# Patient Record
Sex: MALE | Race: WHITE | NOT HISPANIC OR LATINO | Employment: FULL TIME | URBAN - METROPOLITAN AREA
[De-identification: names, ages, dates, MRNs, and addresses within clinical notes are randomized per-mention and may not be internally consistent; named-entity substitution may affect disease eponyms.]

---

## 2024-09-16 ENCOUNTER — ANCILLARY PROCEDURE (OUTPATIENT)
Dept: GENERAL RADIOLOGY | Facility: CLINIC | Age: 39
End: 2024-09-16
Attending: PHYSICIAN ASSISTANT
Payer: COMMERCIAL

## 2024-09-16 ENCOUNTER — OFFICE VISIT (OUTPATIENT)
Dept: URGENT CARE | Facility: URGENT CARE | Age: 39
End: 2024-09-16
Payer: COMMERCIAL

## 2024-09-16 VITALS
DIASTOLIC BLOOD PRESSURE: 74 MMHG | HEIGHT: 71 IN | RESPIRATION RATE: 16 BRPM | HEART RATE: 62 BPM | BODY MASS INDEX: 23.8 KG/M2 | TEMPERATURE: 98.4 F | WEIGHT: 170 LBS | SYSTOLIC BLOOD PRESSURE: 113 MMHG | OXYGEN SATURATION: 98 %
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
## 2024-09-16 DIAGNOSIS — S92.422A CLOSED DISPLACED FRACTURE OF DISTAL PHALANX OF LEFT GREAT TOE, INITIAL ENCOUNTER: ICD-10-CM

## 2024-09-16 PROCEDURE — 73630 X-RAY EXAM OF FOOT: CPT | Mod: TC | Performed by: INTERNAL MEDICINE

## 2024-09-16 PROCEDURE — 99204 OFFICE O/P NEW MOD 45 MIN: CPT | Performed by: PHYSICIAN ASSISTANT

## 2024-09-16 RX ORDER — ROSUVASTATIN CALCIUM 10 MG/1
10 TABLET, COATED ORAL DAILY
COMMUNITY
Start: 2024-07-22 | End: 2025-07-22

## 2024-09-16 RX ORDER — IBUPROFEN 800 MG/1
800 TABLET, FILM COATED ORAL EVERY 8 HOURS PRN
Qty: 30 TABLET | Refills: 0 | Status: SHIPPED | OUTPATIENT
Start: 2024-09-16

## 2024-09-16 ASSESSMENT — ENCOUNTER SYMPTOMS
ARTHRALGIAS: 1
COLOR CHANGE: 1
JOINT SWELLING: 1

## 2024-09-16 NOTE — PROGRESS NOTES
"SUBJECTIVE:   Jaret Riddle is a 39 year old male presenting with a chief complaint of   Chief Complaint   Patient presents with    Musculoskeletal Problem     Injured left foot x1 day ago, painful to put pressure on, swelling     Urgent Care       He is a new patient of [facility].  Patient presents with left foot pain that occurred while running and a ball caught between legs.  Patient wearing gym shoes at the time.  States initially ok, but unable to walk normally.      Treatment:  ice    Review of Systems   Musculoskeletal:  Positive for arthralgias and joint swelling.   Skin:  Positive for color change.   All other systems reviewed and are negative.      Past Medical History:   Diagnosis Date    NO ACTIVE PROBLEMS      Family History   Problem Relation Age of Onset    Hypertension Mother     Lipids Mother     Cancer - colorectal Paternal Grandmother         diagnosed age 70    Unknown/Adopted Father     C.A.D. Maternal Grandfather         heart attacks     Current Outpatient Medications   Medication Sig Dispense Refill    ibuprofen (ADVIL/MOTRIN) 800 MG tablet Take 1 tablet (800 mg) by mouth every 8 hours as needed for moderate pain. 30 tablet 0    rosuvastatin (CRESTOR) 10 MG tablet Take 10 mg by mouth daily.      NO ACTIVE MEDICATIONS .       Social History     Tobacco Use    Smoking status: Never    Smokeless tobacco: Never   Substance Use Topics    Alcohol use: No       OBJECTIVE  /74   Pulse 62   Temp 98.4  F (36.9  C) (Temporal)   Resp 16   Ht 1.803 m (5' 11\")   Wt 77.1 kg (170 lb)   SpO2 98%   BMI 23.71 kg/m      Physical Exam  Vitals and nursing note reviewed.   Constitutional:       Appearance: Normal appearance. He is normal weight.   Cardiovascular:      Rate and Rhythm: Normal rate.   Musculoskeletal:      Comments: Left foot:  great toe with ecchymosis, erythema and edema.  DP palpated.  Painful ROM.     Neurological:      General: No focal deficit present.      Mental Status: He is " alert.         Labs:  Results for orders placed or performed in visit on 09/16/24 (from the past 24 hour(s))   XR Foot Left G/E 3 Views    Narrative    FOOT LEFT THREE OR MORE VIEWS September 16, 2024 11:07 AM     HISTORY: Fall, initial encounter.    COMPARISON: Radiograph 5/28/2010.      Impression    IMPRESSION:   1. Mildly displaced intra-articular left first proximal phalanx  fracture along the tibial aspect of the phalangeal base.  2. Joint spaces are preserved.       X-Ray was done, my findings are: Xrays reviewed by myself and independently interpreted.  Any significant discrepancies with official radiologic read, patient will be notified.       1st phalanx fracture       ASSESSMENT:      ICD-10-CM    1. Fall, initial encounter  W19.XXXA XR Foot Left G/E 3 Views     ibuprofen (ADVIL/MOTRIN) 800 MG tablet      2. Closed displaced fracture of distal phalanx of left great toe, initial encounter  S92.422A            Medical Decision Making:    Differential Diagnosis:  MS Injury Pain: fracture and contusion    Serious Comorbid Conditions:  Adult:   reviewed    PLAN:  Patient placed in a walking boot.  Referral to podiatry.  Rx for ibuprofen with food.  Discussed reasons to seek immediate medical attention.  Additionally if no improvement or worsening in one week, may follow up with PCP and/or UC.        Followup:    If not improving or if condition worsens, follow up with your Primary Care Provider, In 10  day(s) follow up with  Podiatry    There are no Patient Instructions on file for this visit.

## 2024-10-03 ENCOUNTER — TELEPHONE (OUTPATIENT)
Dept: PODIATRY | Facility: CLINIC | Age: 39
End: 2024-10-03

## 2024-10-03 ENCOUNTER — OFFICE VISIT (OUTPATIENT)
Dept: PODIATRY | Facility: CLINIC | Age: 39
End: 2024-10-03
Attending: PHYSICIAN ASSISTANT
Payer: COMMERCIAL

## 2024-10-03 VITALS
BODY MASS INDEX: 23.8 KG/M2 | HEART RATE: 70 BPM | SYSTOLIC BLOOD PRESSURE: 111 MMHG | WEIGHT: 170 LBS | HEIGHT: 71 IN | DIASTOLIC BLOOD PRESSURE: 73 MMHG

## 2024-10-03 DIAGNOSIS — S92.412A CLOSED DISPLACED FRACTURE OF PROXIMAL PHALANX OF LEFT GREAT TOE, INITIAL ENCOUNTER: Primary | ICD-10-CM

## 2024-10-03 DIAGNOSIS — M25.80: ICD-10-CM

## 2024-10-03 PROCEDURE — 99203 OFFICE O/P NEW LOW 30 MIN: CPT | Performed by: PODIATRIST

## 2024-10-03 ASSESSMENT — PAIN SCALES - GENERAL: PAINLEVEL: MODERATE PAIN (4)

## 2024-10-03 NOTE — TELEPHONE ENCOUNTER
Ottoniel Garcia,    Can we try to schedule this patient for surgery for October 14.  He was seen by Dr. Luis Antonio Castaneda up [place] but lives closer to here and wanting surgery at either [facility] or [facility] so been contacting me to see about surgery.  I told him I could.    Thanks Marylou Garcia, OMKARM

## 2024-10-03 NOTE — PROGRESS NOTES
PATIENT HISTORY:  Jaret Riddle is a 39 year old male who presents to clinic in consultation at the request of  Trudi GRANGER with a chief complaint of great left toe fracture.  The patient is seen by themselves.  The patient relates the pain is primarily located around the great toe.  Patient describes injury as playing a game and kicked a ball with that toe injuring it.  The patient relates that the symptoms have been going on for several week(s).  The patient has previously tried different shoes, pain medication, boot, and rest with little relief.  The patient is currently employed as recreation programming .  Any previous notes and studies that pertain to the patient's condition were reviewed.    Pertinent medical, surgical and family history was reviewed in the Epic chart.    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 (Patient not taking: Reported on 10/24/2024), Disp: 30 tablet, Rfl: 0    rosuvastatin (CRESTOR) 10 MG tablet, Take 10 mg by mouth daily., Disp: , Rfl:     acetaminophen (TYLENOL) 500 MG tablet, Take 500-1,000 mg by mouth every 6 hours as needed for mild pain. (Patient not taking: Reported on 10/24/2024), Disp: , Rfl:     HYDROcodone-acetaminophen (NORCO) 5-325 MG tablet, Take 1-2 tablets by mouth every 4 hours as needed for moderate to severe pain. (Patient not taking: Reported on 10/24/2024), Disp: 20 tablet, Rfl: 0    ondansetron (ZOFRAN ODT) 4 MG ODT tab, Take 1 tablet (4 mg) by mouth every 8 hours as needed for nausea. (Patient not taking: Reported on 10/24/2024), Disp: 4 tablet, Rfl: 0    senna-docusate (SENOKOT-S/PERICOLACE) 8.6-50 MG tablet, Take  "1-2 tablets by mouth 2 times daily. (Patient not taking: Reported on 10/24/2024), Disp: 30 tablet, Rfl: 0     Allergies:  No Known Allergies    Vitals: /73   Pulse 70   Ht 1.803 m (5' 11\")   Wt 77.1 kg (170 lb)   BMI 23.71 kg/m    BMI= Body mass index is 23.71 kg/m .    LOWER EXTREMITY PHYSICAL EXAM    Dermatologic: Skin is intact to left lower extremity without significant lesions, rash or abrasion.        Vascular: DP & PT pulses are intact & regular on the left.   CFT and skin temperature is normal to the left lower extremity.     Neurologic: Lower extremity sensation is intact to light touch.  No evidence of weakness in the left lower extremity.        Musculoskeletal: Patient is ambulatory without assistive device or brace.  No gross ankle deformity noted.  No foot or ankle joint effusion is noted.  Noted pain on palpation over the base of the left great toe.  Limited range of motion of the first metatarsophalangeal on the left.    Diagnostics:  Radiographs included three views of the left foot demonstrating a small avulsion fracture off the base of the proximal phalanx of the left great toe.  No cortical erosions or periosteal elevation.  All joint margins appear stable.  There is no apparent fracture or tumor formation noted.  There is no evidence of foreign body.  The images were independently reviewed by myself along with the patient explaining the findings.      ASSESSMENT / PLAN:     ICD-10-CM    1. Closed displaced fracture of proximal phalanx of left great toe, initial encounter  S92.412A           I have explained to Jaret about the conditions.  We discussed the underlying contributing factors to the condition as well as both conservative and surgical treatment options along with expected length of recovery.  At this time, the patient was coordinated with Dr. Manning for surgical repair of the left great toe fracture.    Jaret verbalized agreement with and understanding of the rational for the " diagnosis and treatment plan.  All questions were answered to best of my ability and the patient's satisfaction. The patient was advised to contact the clinic with any questions that may arise after the clinic visit.      Disclaimer: This note consists of symbols derived from keyboarding, dictation and/or voice recognition software. As a result, there may be errors in the script that have gone undetected. Please consider this when interpreting information found in this chart.       JUAN Castaneda D.P.M., [email or web address].

## 2024-10-03 NOTE — NURSING NOTE
"Chief Complaint   Patient presents with    Fracture     Great left toe       Initial /73   Pulse 70   Ht 1.803 m (5' 11\")   Wt 77.1 kg (170 lb)   BMI 23.71 kg/m   Estimated body mass index is 23.71 kg/m  as calculated from the following:    Height as of this encounter: 1.803 m (5' 11\").    Weight as of this encounter: 77.1 kg (170 lb).  Medications and allergies reviewed.      Radha TAYLOR MA    "

## 2024-10-03 NOTE — LETTER
10/3/2024      Jarte Riddle  [address]  [place] [address]      Dear Colleague,    Thank you for referring your patient, Jaret Riddle, to the [facility]. Please see a copy of my visit note below.    PATIENT HISTORY:  Jaret Riddle is a 39 year old male who presents to clinic in consultation at the request of  Trudi GRANGER with a chief complaint of great left toe fracture.  The patient is seen by themselves.  The patient relates the pain is primarily located around the great toe.  Patient describes injury as playing a game and kicked a ball with that toe injuring it.  The patient relates that the symptoms have been going on for several week(s).  The patient has previously tried different shoes, pain medication, boot, and rest with little relief.  The patient is currently employed as recreation programming .  Any previous notes and studies that pertain to the patient's condition were reviewed.    Pertinent medical, surgical and family history was reviewed in the Epic chart.    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 (Patient not taking: Reported on 10/24/2024), Disp: 30 tablet, Rfl: 0     rosuvastatin (CRESTOR) 10 MG tablet, Take 10 mg by mouth daily., Disp: , Rfl:      acetaminophen (TYLENOL) 500 MG tablet, Take 500-1,000 mg by mouth every 6 hours as needed for mild pain. (Patient not taking: Reported on 10/24/2024), Disp: , Rfl:      HYDROcodone-acetaminophen (NORCO) 5-325 MG tablet, Take 1-2 tablets by mouth every 4 hours as needed for moderate to severe pain. (Patient not taking: Reported on 10/24/2024), Disp:  "20 tablet, Rfl: 0     ondansetron (ZOFRAN ODT) 4 MG ODT tab, Take 1 tablet (4 mg) by mouth every 8 hours as needed for nausea. (Patient not taking: Reported on 10/24/2024), Disp: 4 tablet, Rfl: 0     senna-docusate (SENOKOT-S/PERICOLACE) 8.6-50 MG tablet, Take 1-2 tablets by mouth 2 times daily. (Patient not taking: Reported on 10/24/2024), Disp: 30 tablet, Rfl: 0     Allergies:  No Known Allergies    Vitals: /73   Pulse 70   Ht 1.803 m (5' 11\")   Wt 77.1 kg (170 lb)   BMI 23.71 kg/m    BMI= Body mass index is 23.71 kg/m .    LOWER EXTREMITY PHYSICAL EXAM    Dermatologic: Skin is intact to left lower extremity without significant lesions, rash or abrasion.        Vascular: DP & PT pulses are intact & regular on the left.   CFT and skin temperature is normal to the left lower extremity.     Neurologic: Lower extremity sensation is intact to light touch.  No evidence of weakness in the left lower extremity.        Musculoskeletal: Patient is ambulatory without assistive device or brace.  No gross ankle deformity noted.  No foot or ankle joint effusion is noted.  Noted pain on palpation over the base of the left great toe.  Limited range of motion of the first metatarsophalangeal on the left.    Diagnostics:  Radiographs included three views of the left foot demonstrating a small avulsion fracture off the base of the proximal phalanx of the left great toe.  No cortical erosions or periosteal elevation.  All joint margins appear stable.  There is no apparent fracture or tumor formation noted.  There is no evidence of foreign body.  The images were independently reviewed by myself along with the patient explaining the findings.      ASSESSMENT / PLAN:     ICD-10-CM    1. Closed displaced fracture of proximal phalanx of left great toe, initial encounter  S92.412A           I have explained to Jaret about the conditions.  We discussed the underlying contributing factors to the condition as well as both " conservative and surgical treatment options along with expected length of recovery.  At this time, the patient was coordinated with Dr. Manning for surgical repair of the left great toe fracture.    Jaret verbalized agreement with and understanding of the rational for the diagnosis and treatment plan.  All questions were answered to best of my ability and the patient's satisfaction. The patient was advised to contact the clinic with any questions that may arise after the clinic visit.      Disclaimer: This note consists of symbols derived from keyboarding, dictation and/or voice recognition software. As a result, there may be errors in the script that have gone undetected. Please consider this when interpreting information found in this chart.       JUAN Castaneda D.P.M., F.A.C.F.A.S.      Again, thank you for allowing me to participate in the care of your patient.        Sincerely,        Vishnu Castaneda DPM

## 2024-10-08 ENCOUNTER — TELEPHONE (OUTPATIENT)
Dept: PODIATRY | Facility: CLINIC | Age: 39
End: 2024-10-08

## 2024-10-08 ENCOUNTER — VIRTUAL VISIT (OUTPATIENT)
Dept: PODIATRY | Facility: CLINIC | Age: 39
End: 2024-10-08
Payer: COMMERCIAL

## 2024-10-08 DIAGNOSIS — M79.672 LEFT FOOT PAIN: ICD-10-CM

## 2024-10-08 DIAGNOSIS — S92.422A CLOSED DISPLACED FRACTURE OF DISTAL PHALANX OF LEFT GREAT TOE, INITIAL ENCOUNTER: Primary | ICD-10-CM

## 2024-10-08 PROCEDURE — 99441 PR PHYSICIAN TELEPHONE EVALUATION 5-10 MIN: CPT | Mod: 93 | Performed by: PODIATRIST

## 2024-10-08 NOTE — PROGRESS NOTES
PATIENT HISTORY:    Jaret Riddle is a 39 year old male requested a virtual visit for left great toe fracture. Injury happened 3 1/2 weeks ago.  Notes he has been wearing the boot. Notes that he gets intermittent pain with walking.  Denies fever, nausea, vomiting.    Review of Systems:  Patient denies fever, chills, rash, wound, stiffness, limping, numbness, weakness, heart burn, blood in stool, chest pain with activity, calf pain when walking, shortness of breath with activity, chronic cough, easy bleeding/bruising, swelling of ankles, excessive thirst, fatigue, depression, anxiety.  .     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, subsequent encounter     Hyperlipidemia     Tinea versicolor         PAST SURGICAL HISTORY:   Past Surgical History:   Procedure Laterality Date    PE TUBES          MEDICATIONS:   Current Outpatient Medications:     ibuprofen (ADVIL/MOTRIN) 800 MG tablet, Take 1 tablet (800 mg) by mouth every 8 hours as needed for moderate pain., Disp: 30 tablet, Rfl: 0    rosuvastatin (CRESTOR) 10 MG tablet, Take 10 mg by mouth daily., Disp: , Rfl:      ALLERGIES:  No Known Allergies     SOCIAL HISTORY:   Social History     Socioeconomic History    Marital status: Single     Spouse name: Not on file    Number of children: Not on file    Years of education: Not on file    Highest education level: Not on file   Occupational History    Not on file   Tobacco Use    Smoking status: Never    Smokeless tobacco: Never   Substance and Sexual Activity    Alcohol use: No    Drug use: No    Sexual activity: Never   Other Topics Concern    Parent/sibling w/ CABG, MI or angioplasty before 65F 55M? No   Social History Narrative    single living with parents.   Works at [employer]     Social Determinants of Health     Financial Resource Strain: Not on file   Food Insecurity: Not on file   Transportation Needs: Not on  file   Physical Activity: Not on file   Stress: Not on file   Social Connections: Not on file   Interpersonal Safety: Not on file   Housing Stability: Not on file        FAMILY HISTORY:   Family History   Problem Relation Age of Onset    Hypertension Mother     Lipids Mother     Cancer - colorectal Paternal Grandmother         diagnosed age 70    Unknown/Adopted Father     C.A.D. Maternal Grandfather         heart attacks       RAdiographs:  left foot xray -  Mildly displaced intra-articular left first proximal phalanx  fracture along the tibial aspect of the phalangeal base.  2. Joint spaces are preserved.     ASSESSMENT:    Closed displaced fracture of distal phalanx of left great toe, initial encounter  Left foot pain     PLAN:  Reviewed patient's chart in [facility].  Reviewed and discussed his x-rays.  Discussed that sometimes we can fix this however if the piece is too small or if the ends have started to sclerosis since it has been a month since his injury we just remove the piece of bone to prevent impingement.  He will be in a boot for about 6 weeks and if we just remove the piece he will likely be in a boot for about 2-3 depending on when the stitches come out.  Just remove the piece to prevent joint impingement.      This is done under monitored anesthesia and local anesthetic.    Talked about risks including infection, numbness, continued pain, non union , recurrence, need for further surgery, blood loss, blood clotting. You will scar.    Discussed that he could go back to work with minimal weightbearing after week or whenever he is off the pain medication as long as it is mostly seated work    We did discuss that the surgery may get canceled and there is still an IV shortage going on.    Questions were answered to patient satisfaction and he will call further questions or concerns.    Marylou Manning DPM, Podiatry/Foot and Ankle Surgery

## 2024-10-08 NOTE — LETTER
10/8/2024      Jaret Riddle  [address]  [place] [place] [facility] [address]      Dear Colleague,    Thank you for referring your patient, Jaret Riddle, to the [facility]. Please see a copy of my visit note below.      PATIENT HISTORY:    Jaret Riddle is a 39 year old male requested a virtual visit for left great toe fracture. Injury happened 3 1/2 weeks ago.  Notes he has been wearing the boot. Notes that he gets intermittent pain with walking.  Denies fever, nausea, vomiting.    Review of Systems:  Patient denies fever, chills, rash, wound, stiffness, limping, numbness, weakness, heart burn, blood in stool, chest pain with activity, calf pain when walking, shortness of breath with activity, chronic cough, easy bleeding/bruising, swelling of ankles, excessive thirst, fatigue, depression, anxiety.  .     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, subsequent encounter      Hyperlipidemia      Tinea versicolor         PAST SURGICAL HISTORY:   Past Surgical History:   Procedure Laterality Date     PE TUBES          MEDICATIONS:   Current Outpatient Medications:      ibuprofen (ADVIL/MOTRIN) 800 MG tablet, Take 1 tablet (800 mg) by mouth every 8 hours as needed for moderate pain., Disp: 30 tablet, Rfl: 0     rosuvastatin (CRESTOR) 10 MG tablet, Take 10 mg by mouth daily., Disp: , Rfl:      ALLERGIES:  No Known Allergies     SOCIAL HISTORY:   Social History     Socioeconomic History     Marital status: Single     Spouse name: Not on file     Number of children: Not on file     Years of education: Not on file     Highest education level: Not on file   Occupational History     Not on file   Tobacco Use     Smoking status: Never     Smokeless tobacco: Never   Substance and Sexual Activity     Alcohol use: No     Drug use: No     Sexual activity: Never   Other Topics Concern      Parent/sibling w/ CABG, MI or angioplasty before 65F 55M? No   Social History Narrative    single living with parents.   Works at [employer]     Social Determinants of Health     Financial Resource Strain: Not on file   Food Insecurity: Not on file   Transportation Needs: Not on file   Physical Activity: Not on file   Stress: Not on file   Social Connections: Not on file   Interpersonal Safety: Not on file   Housing Stability: Not on file        FAMILY HISTORY:   Family History   Problem Relation Age of Onset     Hypertension Mother      Lipids Mother      Cancer - colorectal Paternal Grandmother         diagnosed age 70     Unknown/Adopted Father      C.A.D. Maternal Grandfather         heart attacks       RAdiographs:  left foot xray -  Mildly displaced intra-articular left first proximal phalanx  fracture along the tibial aspect of the phalangeal base.  2. Joint spaces are preserved.     ASSESSMENT:    Closed displaced fracture of distal phalanx of left great toe, initial encounter  Left foot pain     PLAN:  Reviewed patient's chart in [facility].  Reviewed and discussed his x-rays.  Discussed that sometimes we can fix this however if the piece is too small or if the ends have started to sclerosis since it has been a month since his injury we just remove the piece of bone to prevent impingement.  He will be in a boot for about 6 weeks and if we just remove the piece he will likely be in a boot for about 2-3 depending on when the stitches come out.  Just remove the piece to prevent joint impingement.      This is done under monitored anesthesia and local anesthetic.    Talked about risks including infection, numbness, continued pain, non union , recurrence, need for further surgery, blood loss, blood clotting. You will scar.    Discussed that he could go back to work with minimal weightbearing after week or whenever he is off the pain medication as long as it is mostly seated work    We did discuss that the surgery may get  canceled and there is still an IV shortage going on.    Questions were answered to patient satisfaction and he will call further questions or concerns.    Marylou Manning DPM, Podiatry/Foot and Ankle Surgery      Again, thank you for allowing me to participate in the care of your patient.        Sincerely,        Marylou aMnning DPM, Podiatry/Foot and Ankle Surgery

## 2024-10-14 ENCOUNTER — APPOINTMENT (OUTPATIENT)
Dept: GENERAL RADIOLOGY | Facility: CLINIC | Age: 39
End: 2024-10-14
Attending: PODIATRIST
Payer: COMMERCIAL

## 2024-10-14 ENCOUNTER — ANESTHESIA EVENT (OUTPATIENT)
Dept: SURGERY | Facility: CLINIC | Age: 39
End: 2024-10-14
Payer: COMMERCIAL

## 2024-10-14 ENCOUNTER — HOSPITAL ENCOUNTER (OUTPATIENT)
Facility: CLINIC | Age: 39
Discharge: HOME | End: 2024-10-14
Attending: PODIATRIST | Admitting: PODIATRIST
Payer: COMMERCIAL

## 2024-10-14 ENCOUNTER — ANESTHESIA (OUTPATIENT)
Dept: SURGERY | Facility: CLINIC | Age: 39
End: 2024-10-14
Payer: COMMERCIAL

## 2024-10-14 VITALS
OXYGEN SATURATION: 99 % | HEIGHT: 71 IN | BODY MASS INDEX: 24.26 KG/M2 | WEIGHT: 173.3 LBS | DIASTOLIC BLOOD PRESSURE: 72 MMHG | SYSTOLIC BLOOD PRESSURE: 102 MMHG | HEART RATE: 60 BPM | TEMPERATURE: 97.5 F | RESPIRATION RATE: 16 BRPM

## 2024-10-14 DIAGNOSIS — Z98.890 POST-OPERATIVE STATE: Primary | ICD-10-CM

## 2024-10-14 PROCEDURE — 258N000003 HC RX IP 258 OP 636

## 2024-10-14 PROCEDURE — 710N000009 HC RECOVERY PHASE 1, LEVEL 1, PER MIN: Performed by: PODIATRIST

## 2024-10-14 PROCEDURE — 250N000011 HC RX IP 250 OP 636: Performed by: ANESTHESIOLOGY

## 2024-10-14 PROCEDURE — 271N000001 HC OR GENERAL SUPPLY NON-STERILE: Performed by: PODIATRIST

## 2024-10-14 PROCEDURE — 28124 PARTIAL REMOVAL OF TOE: CPT | Performed by: ANESTHESIOLOGY

## 2024-10-14 PROCEDURE — 28124 PARTIAL REMOVAL OF TOE: CPT | Mod: TA | Performed by: PODIATRIST

## 2024-10-14 PROCEDURE — 999N000065 XR FOOT PORT LEFT 2 VIEWS: Mod: LT

## 2024-10-14 PROCEDURE — 250N000011 HC RX IP 250 OP 636

## 2024-10-14 PROCEDURE — 999N000179 XR SURGERY CARM FLUORO LESS THAN 5 MIN W STILLS

## 2024-10-14 PROCEDURE — 272N000001 HC OR GENERAL SUPPLY STERILE: Performed by: PODIATRIST

## 2024-10-14 PROCEDURE — 360N000084 HC SURGERY LEVEL 4 W/ FLUORO, PER MIN: Performed by: PODIATRIST

## 2024-10-14 PROCEDURE — 999N000141 HC STATISTIC PRE-PROCEDURE NURSING ASSESSMENT: Performed by: PODIATRIST

## 2024-10-14 PROCEDURE — 250N000011 HC RX IP 250 OP 636: Mod: JW | Performed by: PODIATRIST

## 2024-10-14 PROCEDURE — 28124 PARTIAL REMOVAL OF TOE: CPT

## 2024-10-14 PROCEDURE — 250N000009 HC RX 250: Performed by: PODIATRIST

## 2024-10-14 PROCEDURE — 250N000009 HC RX 250: Performed by: ANESTHESIOLOGY

## 2024-10-14 PROCEDURE — 710N000012 HC RECOVERY PHASE 2, PER MINUTE: Performed by: PODIATRIST

## 2024-10-14 PROCEDURE — 258N000003 HC RX IP 258 OP 636: Performed by: ANESTHESIOLOGY

## 2024-10-14 PROCEDURE — 370N000017 HC ANESTHESIA TECHNICAL FEE, PER MIN: Performed by: PODIATRIST

## 2024-10-14 RX ORDER — ONDANSETRON 4 MG/1
4 TABLET, ORALLY DISINTEGRATING ORAL EVERY 8 HOURS PRN
Qty: 4 TABLET | Refills: 0 | Status: SHIPPED | OUTPATIENT
Start: 2024-10-14

## 2024-10-14 RX ORDER — MAGNESIUM HYDROXIDE 1200 MG/15ML
LIQUID ORAL PRN
Status: DISCONTINUED | OUTPATIENT
Start: 2024-10-14 | End: 2024-10-14 | Stop reason: HOSPADM

## 2024-10-14 RX ORDER — DEXAMETHASONE SODIUM PHOSPHATE 4 MG/ML
4 INJECTION, SOLUTION INTRA-ARTICULAR; INTRALESIONAL; INTRAMUSCULAR; INTRAVENOUS; SOFT TISSUE
Status: DISCONTINUED | OUTPATIENT
Start: 2024-10-14 | End: 2024-10-14 | Stop reason: HOSPADM

## 2024-10-14 RX ORDER — ONDANSETRON 2 MG/ML
4 INJECTION INTRAMUSCULAR; INTRAVENOUS EVERY 30 MIN PRN
Status: DISCONTINUED | OUTPATIENT
Start: 2024-10-14 | End: 2024-10-14 | Stop reason: HOSPADM

## 2024-10-14 RX ORDER — HYDROMORPHONE HCL IN WATER/PF 6 MG/30 ML
0.2 PATIENT CONTROLLED ANALGESIA SYRINGE INTRAVENOUS EVERY 5 MIN PRN
Status: DISCONTINUED | OUTPATIENT
Start: 2024-10-14 | End: 2024-10-14 | Stop reason: HOSPADM

## 2024-10-14 RX ORDER — SODIUM CHLORIDE, SODIUM LACTATE, POTASSIUM CHLORIDE, CALCIUM CHLORIDE 600; 310; 30; 20 MG/100ML; MG/100ML; MG/100ML; MG/100ML
INJECTION, SOLUTION INTRAVENOUS CONTINUOUS
Status: DISCONTINUED | OUTPATIENT
Start: 2024-10-14 | End: 2024-10-14 | Stop reason: HOSPADM

## 2024-10-14 RX ORDER — HYDROMORPHONE HCL IN WATER/PF 6 MG/30 ML
0.4 PATIENT CONTROLLED ANALGESIA SYRINGE INTRAVENOUS EVERY 5 MIN PRN
Status: DISCONTINUED | OUTPATIENT
Start: 2024-10-14 | End: 2024-10-14 | Stop reason: HOSPADM

## 2024-10-14 RX ORDER — CEFAZOLIN SODIUM/WATER 2 G/20 ML
SYRINGE (ML) INTRAVENOUS PRN
Status: DISCONTINUED | OUTPATIENT
Start: 2024-10-14 | End: 2024-10-14

## 2024-10-14 RX ORDER — DEXAMETHASONE SODIUM PHOSPHATE 4 MG/ML
INJECTION, SOLUTION INTRA-ARTICULAR; INTRALESIONAL; INTRAMUSCULAR; INTRAVENOUS; SOFT TISSUE PRN
Status: DISCONTINUED | OUTPATIENT
Start: 2024-10-14 | End: 2024-10-14

## 2024-10-14 RX ORDER — SODIUM CHLORIDE, SODIUM LACTATE, POTASSIUM CHLORIDE, CALCIUM CHLORIDE 600; 310; 30; 20 MG/100ML; MG/100ML; MG/100ML; MG/100ML
INJECTION, SOLUTION INTRAVENOUS CONTINUOUS PRN
Status: DISCONTINUED | OUTPATIENT
Start: 2024-10-14 | End: 2024-10-14

## 2024-10-14 RX ORDER — ACETAMINOPHEN 500 MG
500-1000 TABLET ORAL EVERY 6 HOURS PRN
COMMUNITY

## 2024-10-14 RX ORDER — FENTANYL CITRATE 50 UG/ML
25 INJECTION, SOLUTION INTRAMUSCULAR; INTRAVENOUS EVERY 5 MIN PRN
Status: DISCONTINUED | OUTPATIENT
Start: 2024-10-14 | End: 2024-10-14 | Stop reason: HOSPADM

## 2024-10-14 RX ORDER — BUPIVACAINE HYDROCHLORIDE 5 MG/ML
INJECTION, SOLUTION EPIDURAL; INTRACAUDAL PRN
Status: DISCONTINUED | OUTPATIENT
Start: 2024-10-14 | End: 2024-10-14 | Stop reason: HOSPADM

## 2024-10-14 RX ORDER — FENTANYL CITRATE 50 UG/ML
50 INJECTION, SOLUTION INTRAMUSCULAR; INTRAVENOUS EVERY 5 MIN PRN
Status: DISCONTINUED | OUTPATIENT
Start: 2024-10-14 | End: 2024-10-14 | Stop reason: HOSPADM

## 2024-10-14 RX ORDER — ONDANSETRON 4 MG/1
4 TABLET, ORALLY DISINTEGRATING ORAL EVERY 30 MIN PRN
Status: DISCONTINUED | OUTPATIENT
Start: 2024-10-14 | End: 2024-10-14 | Stop reason: HOSPADM

## 2024-10-14 RX ORDER — AMOXICILLIN 250 MG
1-2 CAPSULE ORAL 2 TIMES DAILY
Qty: 30 TABLET | Refills: 0 | Status: SHIPPED | OUTPATIENT
Start: 2024-10-14

## 2024-10-14 RX ORDER — PROPOFOL 10 MG/ML
INJECTION, EMULSION INTRAVENOUS CONTINUOUS PRN
Status: DISCONTINUED | OUTPATIENT
Start: 2024-10-14 | End: 2024-10-14

## 2024-10-14 RX ORDER — ONDANSETRON 2 MG/ML
INJECTION INTRAMUSCULAR; INTRAVENOUS PRN
Status: DISCONTINUED | OUTPATIENT
Start: 2024-10-14 | End: 2024-10-14

## 2024-10-14 RX ORDER — NALOXONE HYDROCHLORIDE 0.4 MG/ML
0.1 INJECTION, SOLUTION INTRAMUSCULAR; INTRAVENOUS; SUBCUTANEOUS
Status: DISCONTINUED | OUTPATIENT
Start: 2024-10-14 | End: 2024-10-14 | Stop reason: HOSPADM

## 2024-10-14 RX ORDER — BUPIVACAINE HYDROCHLORIDE 5 MG/ML
INJECTION, SOLUTION EPIDURAL; INTRACAUDAL
Status: DISCONTINUED
Start: 2024-10-14 | End: 2024-10-14 | Stop reason: HOSPADM

## 2024-10-14 RX ORDER — PROPOFOL 10 MG/ML
INJECTION, EMULSION INTRAVENOUS PRN
Status: DISCONTINUED | OUTPATIENT
Start: 2024-10-14 | End: 2024-10-14

## 2024-10-14 RX ORDER — HYDROCODONE BITARTRATE AND ACETAMINOPHEN 5; 325 MG/1; MG/1
1 TABLET ORAL
Status: DISCONTINUED | OUTPATIENT
Start: 2024-10-14 | End: 2024-10-14 | Stop reason: HOSPADM

## 2024-10-14 RX ORDER — LIDOCAINE HYDROCHLORIDE 20 MG/ML
INJECTION, SOLUTION INFILTRATION; PERINEURAL PRN
Status: DISCONTINUED | OUTPATIENT
Start: 2024-10-14 | End: 2024-10-14

## 2024-10-14 RX ORDER — HYDROCODONE BITARTRATE AND ACETAMINOPHEN 5; 325 MG/1; MG/1
1-2 TABLET ORAL EVERY 4 HOURS PRN
Qty: 20 TABLET | Refills: 0 | Status: SHIPPED | OUTPATIENT
Start: 2024-10-14

## 2024-10-14 RX ADMIN — DEXMEDETOMIDINE HYDROCHLORIDE 8 MCG: 100 INJECTION, SOLUTION INTRAVENOUS at 15:22

## 2024-10-14 RX ADMIN — MIDAZOLAM 2 MG: 1 INJECTION INTRAMUSCULAR; INTRAVENOUS at 15:04

## 2024-10-14 RX ADMIN — SODIUM CHLORIDE, POTASSIUM CHLORIDE, SODIUM LACTATE AND CALCIUM CHLORIDE: 600; 310; 30; 20 INJECTION, SOLUTION INTRAVENOUS at 15:04

## 2024-10-14 RX ADMIN — PROPOFOL 20 MG: 10 INJECTION, EMULSION INTRAVENOUS at 15:10

## 2024-10-14 RX ADMIN — PROPOFOL 50 MG: 10 INJECTION, EMULSION INTRAVENOUS at 15:06

## 2024-10-14 RX ADMIN — PHENYLEPHRINE HYDROCHLORIDE 100 MCG: 10 INJECTION INTRAVENOUS at 15:39

## 2024-10-14 RX ADMIN — PROPOFOL 150 MCG/KG/MIN: 10 INJECTION, EMULSION INTRAVENOUS at 15:07

## 2024-10-14 RX ADMIN — ONDANSETRON 4 MG: 2 INJECTION INTRAMUSCULAR; INTRAVENOUS at 15:06

## 2024-10-14 RX ADMIN — DEXMEDETOMIDINE HYDROCHLORIDE 12 MCG: 100 INJECTION, SOLUTION INTRAVENOUS at 15:19

## 2024-10-14 RX ADMIN — PHENYLEPHRINE HYDROCHLORIDE 100 MCG: 10 INJECTION INTRAVENOUS at 15:36

## 2024-10-14 RX ADMIN — Medication 2 G: at 15:06

## 2024-10-14 RX ADMIN — LIDOCAINE HYDROCHLORIDE 100 MG: 20 INJECTION, SOLUTION INFILTRATION; PERINEURAL at 15:06

## 2024-10-14 RX ADMIN — DEXAMETHASONE SODIUM PHOSPHATE 4 MG: 4 INJECTION, SOLUTION INTRA-ARTICULAR; INTRALESIONAL; INTRAMUSCULAR; INTRAVENOUS; SOFT TISSUE at 15:06

## 2024-10-14 ASSESSMENT — ACTIVITIES OF DAILY LIVING (ADL)
ADLS_ACUITY_SCORE: 35

## 2024-10-14 NOTE — DISCHARGE INSTRUCTIONS
Same Day Surgery Discharge Instructions for  Sedation and General Anesthesia     It's not unusual to feel dizzy, light-headed or faint for up to 24 hours after surgery or while taking pain medication.  If you have these symptoms: sit for a few minutes before standing and have someone assist you when you get up to walk or use the bathroom.    You should rest and relax for the next 24 hours. We recommend you make arrangements to have an adult stay with you for at least 24 hours after your discharge.  Avoid hazardous and strenuous activity.    DO NOT DRIVE any vehicle or operate mechanical equipment for 24 hours following the end of your surgery.  Even though you may feel normal, your reactions may be affected by the medication you have received.    Do not drink alcoholic beverages for 24 hours following surgery.     Slowly progress to your regular diet as you feel able. It's not unusual to feel nauseated and/or vomit after receiving anesthesia.  If you develop these symptoms, drink clear liquids (apple juice, ginger ale, broth, 7-up, etc. ) until you feel better.  If your nausea and vomiting persists for 24 hours, please notify your surgeon.      All narcotic pain medications, along with inactivity and anesthesia, can cause constipation. Drinking plenty of liquids and increasing fiber intake will help.    For any questions of a medical nature, call your surgeon.    Do not make important decisions for 24 hours.    If you had general anesthesia, you may have a sore throat for a couple of days related to the breathing tube used during surgery.  You may use Cepacol lozenges to help with this discomfort.  If it worsens or if you develop a fever, contact your surgeon.     If you feel your pain is not well managed with the pain medications prescribed by your surgeon, please contact your surgeon's office to let them know so they can address your concerns.        **If you have questions or concerns about your procedure,   call  Dr Manning [phone]**

## 2024-10-14 NOTE — ANESTHESIA PREPROCEDURE EVALUATION
"Anesthesia Pre-Procedure Evaluation    Patient: Jaret Riddle   MRN: [id number] : 1985        Procedure : Procedure(s):  OPEN REDUCTION INTERNAL FIXATION, FOOT left          Past Medical History:   Diagnosis Date    Closed displaced fracture of proximal phalanx of left great toe with nonunion, subsequent encounter     Hyperlipidemia     Tinea versicolor       Past Surgical History:   Procedure Laterality Date    PE TUBES        No Known Allergies   Social History     Tobacco Use    Smoking status: Never    Smokeless tobacco: Never   Substance Use Topics    Alcohol use: No      Wt Readings from Last 1 Encounters:   10/03/24 77.1 kg (170 lb)        Anesthesia Evaluation   Pt has had prior anesthetic.     No history of anesthetic complications       ROS/MED HX  ENT/Pulmonary:    (-) sleep apnea   Neurologic:       Cardiovascular:     (+) Dyslipidemia - -   -  - -                                      METS/Exercise Tolerance:     Hematologic:       Musculoskeletal:       GI/Hepatic:    (-) GERD   Renal/Genitourinary:       Endo:       Psychiatric/Substance Use:       Infectious Disease:       Malignancy:       Other:            Physical Exam    Airway        Mallampati: II   TM distance: > 3 FB   Neck ROM: full   Mouth opening: > 3 cm    Respiratory Devices and Support         Dental       (+) Minor Abnormalities - some fillings, tiny chips      Cardiovascular   cardiovascular exam normal          Pulmonary   pulmonary exam normal                OUTSIDE LABS:  CBC:   Lab Results   Component Value Date    WBC 4.8 2010    HGB 15.0 2010    HCT 45.2 2010     2010     BMP: No results found for: \"NA\", \"POTASSIUM\", \"CHLORIDE\", \"CO2\", \"BUN\", \"CR\", \"GLC\"  COAGS: No results found for: \"PTT\", \"INR\", \"FIBR\"  POC: No results found for: \"BGM\", \"HCG\", \"HCGS\"  HEPATIC: No results found for: \"ALBUMIN\", \"PROTTOTAL\", \"ALT\", \"AST\", \"GGT\", \"ALKPHOS\", \"BILITOTAL\", \"BILIDIRECT\", \"SILAS\"  OTHER:   Lab " Results   Component Value Date    TSH 1.59 05/01/2010       Anesthesia Plan    ASA Status:  2    NPO Status:  NPO Appropriate    Anesthesia Type: MAC.     - Reason for MAC: straight local not clinically adequate              Consents    Anesthesia Plan(s) and associated risks, benefits, and realistic alternatives discussed. Questions answered and patient/representative(s) expressed understanding.     - Discussed:     - Discussed with:  Patient            Postoperative Care    Pain management: IV analgesics, Multi-modal analgesia.        Comments:               Jose Cesar MD    I have reviewed the pertinent notes and labs in the chart from the past 30 days and (re)examined the patient.  Any updates or changes from those notes are reflected in this note.

## 2024-10-14 NOTE — LETTER
[facility] PREOP/PHASE II  [address]  [facility] [address]  Phone: [phone]    October 14, 2024        Jaret Riddle  [address]  [facility] [address]          To whom it may concern:    RE: Jaret Riddle    Patient is undergoing surgery today on his foot. Please excuse him from work until 10/25/2024.  Will reassess at that time. Please call with questions or concerns.     Please contact me for questions or concerns.      Sincerely,            Marylou Manning, DPM, Pod*

## 2024-10-14 NOTE — BRIEF OP NOTE
[facility]    Brief Operative Note    Pre-operative diagnosis: Closed displaced fracture of proximal phalanx of left great toe, initial encounter [S92.412A]  Joint impingement affecting osseous tissue [M25.80]  Post-operative diagnosis Same as pre-operative diagnosis    Procedure: BONE EXCISION LEFT GREAT TOE, Left - Foot    Surgeon: Surgeons and Role:     * Marylou Manning, CLARITZA, Podiatry/Foot and Ankle Surgery - Primary  Anesthesia: MAC with Local   Estimated Blood Loss: Minimal    Drains: None  Specimens: * No specimens in log *  Findings:   None.  Complications: None.  Implants: * No implants in log *

## 2024-10-14 NOTE — OR NURSING
VSS, denies pain. Pt has +2 palpable pulses, CMS intact & dressing CDI. Pt fitted for cam walker by orthotics, pt has own boot and did not need new boot dispensed; Pt also has own crutches. Pt dressed and tx to phase II.

## 2024-10-14 NOTE — OP NOTE
[facility] Podiatry Operative Note    Patient Name:                           Jaret Riddle  Patient YOB: 1985  Date of Surgery:                       10/14/2024  CSN:                                         [id number]    Pre-operative diagnosis: Closed displaced fracture of proximal phalanx of left great toe, initial encounter [S92.412A]  Joint impingement affecting osseous tissue [M25.80]   Post-operative diagnosis: Same   Procedure: Bone excision left great toe   Surgeon: Marylou Manning DPM, Podiatry/Foot and Ankle Surgery   Assistant(s): None   Anesthesia: MAC with local     Hemostasis:                             Ankle tourniquet with electrocautery    Estimated Blood Loss:              2 mL    Speceimens:                            None    Materials:                                  None      Indications:  is a 39-year-old male that presented to clinic after injury to his left great toe.  He had been seen in urgent care for fracture of the proximal phalanx of the left great toe.  Was seen by my partner Dr. George who was concerned about the bone impinging in the joint and patient was continuing to have pain.  Talked about going in and doing an ORIF of the fractured fragment versus removing the small piece of bone to try to prevent joint impingement and early arthritis.  Risk benefits and complications were discussed with patient no guarantees were made.  He wishes to proceed with surgery.      Procecure: Patient is brought to the operating room placed operating table supine position.  Anesthesia was administered and local was injected.  The foot was prepped and draped in sterile technique.  Ankle tourniquet was inflated to 250 mmHg and attention was directed to the medial aspect of the left first metatarsal phalangeal joint.  A linear incision approximately 4 cm was made over the joint area through skin with a #15 blade.  Blunt dissection was used down to the level of  the first metatarsal phalangeal joint capsule.  All vessels that were noted were ligated with electrocautery.  Once down to the capsule this was incised and sharply dissected off the base of the proximal phalanx.  The small avulsed bone was then identified.  This was very thin and already fractured.  Therefore it was decided to just remove the piece instead of doing internal fixation to repair this.  This was sharply dissected out.  This was visualized under FluoroScan and all bone fragments were noted to be removed from the joint.  The wounds flushed copious amounts normal saline.  The joint capsule was reapproximated using 3-0 Vicryl the subcutaneous was reapproximated using 4-0 Vicryl and the skin was reapproximate using 4-0 Prolene.  Patient's foot was placed in a dry sterile dressing.  Patient tolerated procedure and anesthesia well was transferred recovery with vital signs stable vascular status intact.    Marylou Manning DPM

## 2024-10-14 NOTE — ANESTHESIA CARE TRANSFER NOTE
Patient: Jaret Riddle    Procedure: Procedure(s):  BONE EXCISION LEFT GREAT TOE       Diagnosis: Closed displaced fracture of proximal phalanx of left great toe, initial encounter [S92.412A]  Joint impingement affecting osseous tissue [M25.80]  Diagnosis Additional Information: No value filed.    Anesthesia Type:   MAC     Note:    Oropharynx: oropharynx clear of all foreign objects and spontaneously breathing  Level of Consciousness: awake and drowsy  Oxygen Supplementation: face mask  Level of Supplemental Oxygen (L/min / FiO2): 6  Independent Airway: airway patency satisfactory and stable  Dentition: dentition unchanged  Vital Signs Stable: post-procedure vital signs reviewed and stable  Report to RN Given: handoff report given  Patient transferred to: PACU    Handoff Report: Identifed the Patient, Identified the Reponsible Provider, Reviewed the pertinent medical history, Discussed the surgical course, Reviewed Intra-OP anesthesia mangement and issues during anesthesia, Set expectations for post-procedure period and Allowed opportunity for questions and acknowledgement of understanding      Vitals:  Vitals Value Taken Time   BP 98/68    Temp     Pulse 58    Resp 44 10/14/24 1551   SpO2 98 % 10/14/24 1551   Vitals shown include unfiled device data.    Electronically Signed By: DANIEL Perez CRNA  October 14, 2024  3:53 PM

## 2024-10-15 NOTE — ANESTHESIA POSTPROCEDURE EVALUATION
Patient: Jaret Riddle    Procedure: Procedure(s):  BONE EXCISION LEFT GREAT TOE       Anesthesia Type:  MAC    Note:  Disposition: Outpatient   Postop Pain Control: Uneventful            Sign Out: Well controlled pain   PONV:    Neuro/Psych: Uneventful            Sign Out: Acceptable/Baseline neuro status   Airway/Respiratory: Uneventful            Sign Out: Acceptable/Baseline resp. status   CV/Hemodynamics: Uneventful            Sign Out: Acceptable CV status   Other NRE: NONE   DID A NON-ROUTINE EVENT OCCUR? No       Last vitals:  Vitals Value Taken Time   BP 99/67 10/14/24 1645   Temp 36.4  C (97.5  F) 10/14/24 1630   Pulse 61 10/14/24 1647   Resp 17 10/14/24 1647   SpO2 99 % 10/14/24 1647   Vitals shown include unfiled device data.    Electronically Signed By: Zhen Durham MD  October 14, 2024  8:37 PM

## 2024-10-24 ENCOUNTER — OFFICE VISIT (OUTPATIENT)
Dept: PODIATRY | Facility: CLINIC | Age: 39
End: 2024-10-24
Payer: COMMERCIAL

## 2024-10-24 VITALS — BODY MASS INDEX: 24.13 KG/M2 | DIASTOLIC BLOOD PRESSURE: 76 MMHG | WEIGHT: 173 LBS | SYSTOLIC BLOOD PRESSURE: 118 MMHG

## 2024-10-24 DIAGNOSIS — Z98.890 POST-OPERATIVE STATE: Primary | ICD-10-CM

## 2024-10-24 PROCEDURE — 99024 POSTOP FOLLOW-UP VISIT: CPT | Performed by: PODIATRIST

## 2024-10-24 NOTE — PROGRESS NOTES
Podiatry / Foot and Ankle Surgery Progress Note    October 24, 2024    Subject: Patient was seen for follow-up 10 days status post bone excision left first metatarsal phalangeal joint.  Notes that he is doing well.  Is not on any pain medication anymore.  Denies fever, nausea, vomiting.    Objective:  Vitals: /76   Wt 78.5 kg (173 lb)   BMI 24.13 kg/m    BMI= Body mass index is 24.13 kg/m .    General:  Patient is alert and orientated.  NAD.    Vascular:  DP and PT pulses are palpable.  No edema or varicosities noted.  CFT's < 3secs.  Skin temp is normal.    Neuro:  Light and gross touch sensation intact to digits, dorsum, and plantar aspects of the feet.    Derm: Dressing is clean dry intact.  Sutures are intact.  Skin is well-approximated.  No redness, dehiscence or signs of acute infection noted.    Musculoskeletal:  No foot deformity noted.      Assessment: Post-operative state    Medical Decision Making/Plan: At this time the sutures were removed.  Patient can get the foot wet.  We will have him continue weightbearing in the postop boot for the next week.  After a week he can switch to tennis shoes.  Would hold off on increased physical activity for at least 2 more weeks.  We will cancel his appointment for next week as we took the stitches out today.  We discussed that he will get aching to the area but if he is getting a lot of sharp pain we want to know about that.    All questions were answered to patient satisfaction and he will call further questions or concerns.      Patient Risk Factor:  Patient is a low risk factor for infection.     Marylou Manning DPM, Podiatry/Foot and Ankle Surgery

## 2024-10-24 NOTE — LETTER
10/24/2024      Jaret Riddle  [address]  [facility] [address]      Dear Colleague,    Thank you for referring your patient, Jaret Riddle, to the [facility] PODIATRY. Please see a copy of my visit note below.    Podiatry / Foot and Ankle Surgery Progress Note    October 24, 2024    Subject: Patient was seen for follow-up 10 days status post bone excision left first metatarsal phalangeal joint.  Notes that he is doing well.  Is not on any pain medication anymore.  Denies fever, nausea, vomiting.    Objective:  Vitals: /76   Wt 78.5 kg (173 lb)   BMI 24.13 kg/m    BMI= Body mass index is 24.13 kg/m .    General:  Patient is alert and orientated.  NAD.    Vascular:  DP and PT pulses are palpable.  No edema or varicosities noted.  CFT's < 3secs.  Skin temp is normal.    Neuro:  Light and gross touch sensation intact to digits, dorsum, and plantar aspects of the feet.    Derm: Dressing is clean dry intact.  Sutures are intact.  Skin is well-approximated.  No redness, dehiscence or signs of acute infection noted.    Musculoskeletal:  No foot deformity noted.      Assessment: Post-operative state    Medical Decision Making/Plan: At this time the sutures were removed.  Patient can get the foot wet.  We will have him continue weightbearing in the postop boot for the next week.  After a week he can switch to tennis shoes.  Would hold off on increased physical activity for at least 2 more weeks.  We will cancel his appointment for next week as we took the stitches out today.  We discussed that he will get aching to the area but if he is getting a lot of sharp pain we want to know about that.    All questions were answered to patient satisfaction and he will call further questions or concerns.      Patient Risk Factor:  Patient is a low risk factor for infection.     Marylou Manning DPM, Podiatry/Foot and Ankle Surgery      Again, thank you for allowing me to participate in the  care of your patient.        Sincerely,        Marylou Manning DPM, Podiatry/Foot and Ankle Surgery

## 2024-10-24 NOTE — PATIENT INSTRUCTIONS
Thank you for choosing [facility] Podiatry / Foot & Ankle Surgery!    DR MARTIN'S CLINIC:  [facility]   [address]   [place] [address]  [phone]    (Tues, Wed, Thur am, Fri pm)     [facility]  [address], [place] [address]  [phone]  (Every other Friday morning)    [facility]  [address] [place] [id number] [phone]  (Every other Friday)     TRIAGE LINE: [phone]  APPOINTMENTS: [phone]  RADIOLOGY: [phone]  SET UP SURGERY: [phone]  PHYSICAL THERAPY: [phone]   FAX NUMBER: [phone]  BILLING QUESTIONS: [phone]       Follow up: As needed      SCAR CARE PROTOCOL  Scarring is an unfortunate but unavoidable part of surgery.  Every person scars differently and there is no way to predict how an individual's final scar will look.  Now that the sutures have been removed one can begin taking some steps to help minimize the appearance of scarring.    WOUND HEALING  As soon as the skin is incised during surgery, the body is taking steps to prepare for healing. After about 3 days, the body has sent cells to the incision to begin the healing process. These cells, called fibroblasts, make collagen, a protein in the skin that helps provide strength. Once the skin has been sufficiently strengthened, the sutures are removed. Over the next year, the body synthesizes new collagen and breaks down old collagen to help achieve a strong scar that allows the foot/ankle to function appropriately. This is where patients can help the appearance of the scar, as it will change over the next year.    STEPS  1. Do not expose the scar to the sun for 1 year.  2. Any sun exposure may permanently darken the appearance of the scar.  3. Wear shoes/socks or cover your scar with zinc oxide.  4. Massage the scar 2-3 times per day.  -Massage the entire length of the scar with gentle to moderate pressure.  -Pressure  can help flatten the scar.  5. Lotion/Vitamin E helps keep the tissue soft.  6. Try over the counter scar products such as Mederma or Scar Zone.  -These are available at any pharmacy without a prescription.  -Patients must use these for extended periods of time (6-12 months) to see a difference.

## 2024-11-10 ENCOUNTER — HEALTH MAINTENANCE LETTER (OUTPATIENT)
Age: 39
End: 2024-11-10

## (undated) DEVICE — LINEN TOWEL PACK X5 5464

## (undated) DEVICE — SU VICRYL 3-0 PS-2 27" UND J427H

## (undated) DEVICE — SU PROLENE 4-0 PC-3 18" 8634G

## (undated) DEVICE — SU VICRYL 4-0 PS-2 18" UND J496H

## (undated) DEVICE — GLOVE BIOGEL PI MICRO INDICATOR UNDERGLOVE SZ 6.5 48965

## (undated) DEVICE — DRSG KERLIX 4 1/2"X4YDS ROLL 6715

## (undated) DEVICE — DRAPE MINI C-ARM 4003

## (undated) DEVICE — PACK EXTREMITY SOP15EXFSD

## (undated) DEVICE — BNDG ELASTIC 4"X5YDS UNSTERILE 6611-40

## (undated) DEVICE — GLOVE BIOGEL PI SZ 6.5 40865

## (undated) DEVICE — IMM LIMB ELEVATOR DC40-0203

## (undated) DEVICE — DRSG GAUZE 4X4" 3033

## (undated) RX ORDER — FENTANYL CITRATE 50 UG/ML
INJECTION, SOLUTION INTRAMUSCULAR; INTRAVENOUS
Status: DISPENSED
Start: 2024-10-14

## (undated) RX ORDER — ONDANSETRON 2 MG/ML
INJECTION INTRAMUSCULAR; INTRAVENOUS
Status: DISPENSED
Start: 2024-10-14

## (undated) RX ORDER — PROPOFOL 10 MG/ML
INJECTION, EMULSION INTRAVENOUS
Status: DISPENSED
Start: 2024-10-14

## (undated) RX ORDER — DEXAMETHASONE SODIUM PHOSPHATE 4 MG/ML
INJECTION, SOLUTION INTRA-ARTICULAR; INTRALESIONAL; INTRAMUSCULAR; INTRAVENOUS; SOFT TISSUE
Status: DISPENSED
Start: 2024-10-14

## (undated) RX ORDER — CEFAZOLIN SODIUM/WATER 2 G/20 ML
SYRINGE (ML) INTRAVENOUS
Status: DISPENSED
Start: 2024-10-14